# Patient Record
Sex: MALE | Race: WHITE | NOT HISPANIC OR LATINO | URBAN - METROPOLITAN AREA
[De-identification: names, ages, dates, MRNs, and addresses within clinical notes are randomized per-mention and may not be internally consistent; named-entity substitution may affect disease eponyms.]

---

## 2017-07-25 ENCOUNTER — OFFICE VISIT - RIVER FALLS (OUTPATIENT)
Dept: FAMILY MEDICINE | Facility: CLINIC | Age: 52
End: 2017-07-25

## 2017-07-25 ASSESSMENT — MIFFLIN-ST. JEOR: SCORE: 1750.9

## 2017-10-16 ENCOUNTER — AMBULATORY - RIVER FALLS (OUTPATIENT)
Dept: FAMILY MEDICINE | Facility: CLINIC | Age: 52
End: 2017-10-16

## 2018-08-24 ENCOUNTER — OFFICE VISIT - RIVER FALLS (OUTPATIENT)
Dept: FAMILY MEDICINE | Facility: CLINIC | Age: 53
End: 2018-08-24

## 2018-08-24 ASSESSMENT — MIFFLIN-ST. JEOR: SCORE: 1721.87

## 2018-09-07 ENCOUNTER — AMBULATORY - RIVER FALLS (OUTPATIENT)
Dept: FAMILY MEDICINE | Facility: CLINIC | Age: 53
End: 2018-09-07

## 2018-09-07 ENCOUNTER — OFFICE VISIT - RIVER FALLS (OUTPATIENT)
Dept: FAMILY MEDICINE | Facility: CLINIC | Age: 53
End: 2018-09-07

## 2018-09-08 LAB
CHOLEST SERPL-MCNC: 259 MG/DL
CHOLEST/HDLC SERPL: 4.5 {RATIO}
CREAT SERPL-MCNC: 1.02 MG/DL (ref 0.7–1.33)
GLUCOSE BLD-MCNC: 82 MG/DL (ref 65–99)
HDLC SERPL-MCNC: 58 MG/DL
LDLC SERPL CALC-MCNC: 177 MG/DL
NONHDLC SERPL-MCNC: 201 MG/DL
TRIGL SERPL-MCNC: 109 MG/DL

## 2018-09-10 ENCOUNTER — AMBULATORY - RIVER FALLS (OUTPATIENT)
Dept: FAMILY MEDICINE | Facility: CLINIC | Age: 53
End: 2018-09-10

## 2018-10-18 ENCOUNTER — AMBULATORY - RIVER FALLS (OUTPATIENT)
Dept: FAMILY MEDICINE | Facility: CLINIC | Age: 53
End: 2018-10-18

## 2019-05-30 ENCOUNTER — OFFICE VISIT - RIVER FALLS (OUTPATIENT)
Dept: FAMILY MEDICINE | Facility: CLINIC | Age: 54
End: 2019-05-30

## 2019-05-30 ASSESSMENT — MIFFLIN-ST. JEOR: SCORE: 1665.63

## 2019-09-13 ENCOUNTER — OFFICE VISIT - RIVER FALLS (OUTPATIENT)
Dept: FAMILY MEDICINE | Facility: CLINIC | Age: 54
End: 2019-09-13

## 2019-09-13 ASSESSMENT — MIFFLIN-ST. JEOR: SCORE: 1654.74

## 2019-09-26 ENCOUNTER — AMBULATORY - RIVER FALLS (OUTPATIENT)
Dept: FAMILY MEDICINE | Facility: CLINIC | Age: 54
End: 2019-09-26

## 2019-09-27 ENCOUNTER — COMMUNICATION - RIVER FALLS (OUTPATIENT)
Dept: FAMILY MEDICINE | Facility: CLINIC | Age: 54
End: 2019-09-27

## 2019-09-27 LAB
BUN SERPL-MCNC: 19 MG/DL (ref 7–25)
BUN/CREAT RATIO - HISTORICAL: NORMAL (ref 6–22)
CALCIUM SERPL-MCNC: 9.5 MG/DL (ref 8.6–10.3)
CHLORIDE BLD-SCNC: 103 MMOL/L (ref 98–110)
CHOLEST SERPL-MCNC: 229 MG/DL
CHOLEST/HDLC SERPL: 3.8 {RATIO}
CO2 SERPL-SCNC: 29 MMOL/L (ref 20–32)
CREAT SERPL-MCNC: 1.06 MG/DL (ref 0.7–1.33)
EGFRCR SERPLBLD CKD-EPI 2021: 78 ML/MIN/1.73M2
ERYTHROCYTE [DISTWIDTH] IN BLOOD BY AUTOMATED COUNT: 11.7 % (ref 11–15)
GLUCOSE BLD-MCNC: 88 MG/DL (ref 65–99)
HCT VFR BLD AUTO: 50.1 % (ref 38.5–50)
HDLC SERPL-MCNC: 60 MG/DL
HGB BLD-MCNC: 16.5 GM/DL (ref 13.2–17.1)
LDLC SERPL CALC-MCNC: 150 MG/DL
MCH RBC QN AUTO: 30.8 PG (ref 27–33)
MCHC RBC AUTO-ENTMCNC: 32.9 GM/DL (ref 32–36)
MCV RBC AUTO: 93.5 FL (ref 80–100)
NONHDLC SERPL-MCNC: 169 MG/DL
PLATELET # BLD AUTO: 230 10*3/UL (ref 140–400)
PMV BLD: 10.5 FL (ref 7.5–12.5)
POTASSIUM BLD-SCNC: 5.1 MMOL/L (ref 3.5–5.3)
PSA SERPL-MCNC: 1.9 NG/ML
RBC # BLD AUTO: 5.36 10*6/UL (ref 4.2–5.8)
SODIUM SERPL-SCNC: 137 MMOL/L (ref 135–146)
TRIGL SERPL-MCNC: 84 MG/DL
WBC # BLD AUTO: 5.8 10*3/UL (ref 3.8–10.8)

## 2019-10-15 ENCOUNTER — OFFICE VISIT - RIVER FALLS (OUTPATIENT)
Dept: FAMILY MEDICINE | Facility: CLINIC | Age: 54
End: 2019-10-15

## 2019-10-15 LAB — COLONOSCOPY STUDY: NORMAL

## 2020-01-03 ENCOUNTER — COMMUNICATION - RIVER FALLS (OUTPATIENT)
Dept: FAMILY MEDICINE | Facility: CLINIC | Age: 55
End: 2020-01-03

## 2020-01-10 ENCOUNTER — AMBULATORY - RIVER FALLS (OUTPATIENT)
Dept: FAMILY MEDICINE | Facility: CLINIC | Age: 55
End: 2020-01-10

## 2020-10-08 ENCOUNTER — OFFICE VISIT - RIVER FALLS (OUTPATIENT)
Dept: FAMILY MEDICINE | Facility: CLINIC | Age: 55
End: 2020-10-08

## 2020-10-12 ENCOUNTER — COMMUNICATION - RIVER FALLS (OUTPATIENT)
Dept: FAMILY MEDICINE | Facility: CLINIC | Age: 55
End: 2020-10-12

## 2020-10-12 LAB
BUN SERPL-MCNC: 19 MG/DL (ref 7–25)
BUN/CREAT RATIO - HISTORICAL: NORMAL (ref 6–22)
CALCIUM SERPL-MCNC: 9.5 MG/DL (ref 8.6–10.3)
CHLORIDE BLD-SCNC: 103 MMOL/L (ref 98–110)
CO2 SERPL-SCNC: 26 MMOL/L (ref 20–32)
CREAT SERPL-MCNC: 1.03 MG/DL (ref 0.7–1.33)
EGFRCR SERPLBLD CKD-EPI 2021: 80 ML/MIN/1.73M2
ERYTHROCYTE [DISTWIDTH] IN BLOOD BY AUTOMATED COUNT: 11.7 % (ref 11–15)
GLUCOSE BLD-MCNC: 80 MG/DL (ref 65–99)
HCT VFR BLD AUTO: 51.4 % (ref 38.5–50)
HGB BLD-MCNC: 17.3 GM/DL (ref 13.2–17.1)
LDLC SERPL CALC-MCNC: 147 MG/DL
MCH RBC QN AUTO: 31.1 PG (ref 27–33)
MCHC RBC AUTO-ENTMCNC: 33.7 GM/DL (ref 32–36)
MCV RBC AUTO: 92.4 FL (ref 80–100)
PLATELET # BLD AUTO: 225 10*3/UL (ref 140–400)
PMV BLD: 10.2 FL (ref 7.5–12.5)
POTASSIUM BLD-SCNC: 4.7 MMOL/L (ref 3.5–5.3)
PSA SERPL-MCNC: 2.3 NG/ML
RBC # BLD AUTO: 5.56 10*6/UL (ref 4.2–5.8)
SODIUM SERPL-SCNC: 138 MMOL/L (ref 135–146)
WBC # BLD AUTO: 6.4 10*3/UL (ref 3.8–10.8)

## 2022-02-11 VITALS
DIASTOLIC BLOOD PRESSURE: 62 MMHG | HEART RATE: 64 BPM | HEIGHT: 72 IN | BODY MASS INDEX: 26.9 KG/M2 | WEIGHT: 198.6 LBS | TEMPERATURE: 97.8 F | SYSTOLIC BLOOD PRESSURE: 110 MMHG

## 2022-02-11 VITALS
BODY MASS INDEX: 24.03 KG/M2 | WEIGHT: 177.4 LBS | SYSTOLIC BLOOD PRESSURE: 110 MMHG | OXYGEN SATURATION: 96 % | DIASTOLIC BLOOD PRESSURE: 64 MMHG | HEIGHT: 72 IN | TEMPERATURE: 97.3 F | HEART RATE: 64 BPM

## 2022-02-11 VITALS
DIASTOLIC BLOOD PRESSURE: 64 MMHG | SYSTOLIC BLOOD PRESSURE: 118 MMHG | WEIGHT: 179.8 LBS | HEIGHT: 72 IN | BODY MASS INDEX: 24.35 KG/M2 | TEMPERATURE: 97.4 F | HEART RATE: 72 BPM

## 2022-02-11 VITALS
TEMPERATURE: 97.8 F | DIASTOLIC BLOOD PRESSURE: 81 MMHG | SYSTOLIC BLOOD PRESSURE: 121 MMHG | BODY MASS INDEX: 23.57 KG/M2 | HEART RATE: 85 BPM | WEIGHT: 175 LBS

## 2022-02-11 VITALS
HEART RATE: 78 BPM | SYSTOLIC BLOOD PRESSURE: 120 MMHG | WEIGHT: 192.2 LBS | BODY MASS INDEX: 26.03 KG/M2 | HEIGHT: 72 IN | DIASTOLIC BLOOD PRESSURE: 72 MMHG

## 2022-02-16 NOTE — PROGRESS NOTES
Patient:   PIPER ZAVALA            MRN: [id number]            FIN: [id number]               Age:   53 years     Sex:  Male     :  1965   Associated Diagnoses:   Well adult   Author:   Adrian Mckee MD      Visit Information      Date of Service: 2018 02:36 pm  Performing Location: [facility]  Encounter#: [id number]      Primary Care Provider (PCP):  Adrian Mckee MD    NPI# [id number]      Referring Provider:  Adiran Mckee MD    NPI# [id number]      Chief Complaint   2018 2:52 PM CDT    Annual Px     Chief complaint and symptoms noted above confirmed with patient.      Well Adult History   Well Adult History             The patient presents for well adult exam.  The patient's general health status is described as good.  The patient's diet is described as balanced.  Exercise: routine.  Additional pertinent history: seat belt use, occasional caffeine use, tobacco use none and alcohol use socially.        Review of Systems   Constitutional:  Negative.    Eye:  Negative.    Ear/Nose/Mouth/Throat:  Negative.    Respiratory:  Negative.    Cardiovascular:  Negative.    Gastrointestinal:  Negative.    Genitourinary:  Negative.    Musculoskeletal:  Negative.    Integumentary:  Negative.    Neurologic:  Negative.    Psychiatric:  Negative.       Health Status   Allergies:    Allergic Reactions (Selected)  No known allergies   Medications:  (Selected)   Documented Medications  Documented  Daily Multiple Vitamins: po, qweek, 0 Refill(s), Type: Maintenance   Problem list:    All Problems  Hemorrhoids / ICD-9-.6 / Confirmed  Lumbar disc herniation / ICD-9-.10 / Confirmed  Migraine / ICD-9-.90 / Confirmed      Histories   Past Medical History:    Active  Lumbar disc herniation (ICD-9-.10): Onset in  at 37 years.  Comments:  2011 CST 3:35 PM CST - Kamila Cordova  L4, 5  Migraine (ICD-9-.90)  Hemorrhoids (ICD-9-.6)   Family History:     Hypertension  Father ()  Allergy  Daughter (Donna)  Cancer of prostate  Father ()     Procedure history:    Tonsillectomy (SNOMED CT 785683325) in 1969 at 4 Years.   Social History:        Alcohol Assessment: Current            1-2 times per month      Tobacco Assessment: Denies Tobacco Use            Never      Substance Abuse Assessment: Denies Substance Abuse      Employment and Education Assessment            Employed, Work/School description: [employer].      Home and Environment Assessment            Marital status: .  Spouse/Partner name: Margi.  Lives with Spouse.  2 children.  Living situation:               Home/Independent.  Smoker in household: No.      Nutrition and Health Assessment            Type of diet: Regular.  Caffeine intake amount: Min..      Exercise and Physical Activity Assessment: Occasional exercise            Exercise type: Walking, Weight lifting, Push-ups, sit-ups.      Other Assessment            .        Physical Examination   Vital Signs   2018 2:52 PM CDT Peripheral Pulse Rate 78 bpm    Pulse Site Radial artery    HR Method Manual    Systolic Blood Pressure 120 mmHg    Diastolic Blood Pressure 72 mmHg    Mean Arterial Pressure 88 mmHg    BP Site Left arm    BP Method Manual      Measurements from flowsheet : Measurements   2018 2:52 PM CDT Height Measured - Standard 71.5 in    Weight Measured - Standard 192.2 lb    BSA 2.1 m2    Body Mass Index 26.43 kg/m2  HI      General:  Alert and oriented, No acute distress.    Eye:  Normal conjunctiva.    HENT:  Normocephalic.    Neck:  Supple.    Respiratory:  Lungs are clear to auscultation.    Cardiovascular:  Normal rate, Regular rhythm.    Gastrointestinal:  Soft, Non-tender, Non-distended.    Genitourinary:  No costovertebral angle tenderness.    Musculoskeletal:  Normal range of motion, Normal strength, No tenderness.    Integumentary:  Warm, Dry, Pink.    Neurologic:  Alert,  Oriented, Normal sensory, Normal motor function.    Psychiatric:  Patient's PHQ9 and CAGE questionnaire reviewed and discussed with patient..       Impression and Plan   Diagnosis     Well adult (NIN12-DP Z00.00).     Course:  Progressing as expected.    Patient Instructions:       Counseled: Patient, Regarding diagnosis, Regarding medications, Verbalized understanding.    Orders     F/U  if not improving, sooner if getting worse.

## 2022-02-16 NOTE — PROGRESS NOTES
Patient:   PIPER ZAVALA            MRN: [id number]            FIN: [id number]               Age:   55 years     Sex:  Male     :  1965   Associated Diagnoses:   Well adult; Screening for diabetes mellitus; Encounter for screening for lipoid disorders; Screening for prostate cancer   Author:   Adrian Mckee MD      Visit Information      Date of Service: 10/08/2020 07:49 am  Performing Location: [facility]  Encounter#: [id number]      Primary Care Provider (PCP):  Adrian Mckee MD    NPI# [id number]      Referring Provider:  Adrian Mckee MD# [id number]      Chief Complaint   10/8/2020 7:54 AM CDT    Annual physical     Chief complaint and symptoms noted above confirmed with patient.      Well Adult History   Well Adult History             The patient presents for well adult exam.  The patient's general health status is described as good.  The patient's diet is described as balanced.  Exercise: routine.  Associated symptoms consist of none.  Additional pertinent history: no caffeine use, tobacco use none and alcohol use socially.        Review of Systems   Constitutional:  Negative.    Respiratory:  Negative.    Cardiovascular:  Negative.    Gastrointestinal:  Negative.    Genitourinary:  Negative.    Musculoskeletal:  Negative.    Integumentary:  Negative.    Neurologic:  Negative.    Psychiatric:  Negative.       Health Status   Allergies:    Allergic Reactions (Selected)  No Known Medication Allergies   Medications:  (Selected)   Prescriptions  Prescribed  Voltaren Arthritis Pain 1% topical gel: ( 2 gm ), Topical, qid, # 240 gm, 1 Refill(s), Type: Maintenance, Pharmacy: [facility], 2 gm Topical qid, 71.5, in, 19 14:54:00 CDT, Height Measured, 175, lb, 10/08/20 7:54:00 CDT, Raeann...  Documented Medications  Documented  Daily Multiple Vitamins: po, qweek, 0 Refill(s), Type: Maintenance  [facility]'s Bounty  Probiotic: Oral, daily, 0 Refill(s), Type: Maintenance   Problem list:    All Problems  Migraine / ICD-9-.90 / Confirmed  Hemorrhoids / ICD-9-.6 / Confirmed  Lumbar disc herniation / ICD-9-.10 / Confirmed      Histories   Past Medical History:    Active  Lumbar disc herniation (ICD-9-.10): Onset in  at 37 years.  Comments:  2011 CST 3:35 PM CST - Kamila Cordova  L4, 5  Migraine (ICD-9-.90)  Hemorrhoids (ICD-9-.6)   Family History:    Hypertension  Father ()  Allergy  Daughter (Donna)  Cancer of prostate  Father ()     Procedure history:    Colonoscopy (SNOMED CT 630940243) performed by Agustin Keith MD on 10/15/2019 at 54 Years.  Comments:  2019 9:44 AM CST - Beverley Keene CMA  Sedation: conscious  Polyps: none  Diverticuli: no    Follow up: 10 year ()  Tonsillectomy (SNOMED CT 803678520) in  at 4 Years.   Social History:        Alcohol Assessment: Current            Beer (12 oz), 1-2 times per month, 1 drinks/episode average.      Tobacco Assessment: Denies Tobacco Use            Never      Substance Abuse Assessment: Denies Substance Abuse            Never      Employment and Education Assessment            Employed, Work/School description: [employer].  Highest education level: College +.      Home and Environment Assessment            Marital status: .  Spouse/Partner name: Margi.  Lives with Spouse.  2 children.  Living situation:               Home/Independent.  Smoker in household: No.  Injuries/Abuse/Neglect in household: No.  Feels unsafe at home:               No.  Family/Friends available for support: Yes.      Nutrition and Health Assessment            Type of diet: Regular.  Caffeine intake amount: Min..  Wants to lose weight: Yes.  Sleeping concerns: Yes.               Feels highly stressed: No.      Exercise and Physical Activity Assessment: Regular exercise            Exercise frequency: 3-4  times/week.  Exercise type: Walking, Weight lifting.      Sexual Assessment            Sexually active: Yes.  Sexual orientation: Straight or heterosexual.      Other Assessment            .        Physical Examination   Vital Signs   10/8/2020 7:54 AM CDT Temperature Tympanic 97.8 DegF  LOW    Peripheral Pulse Rate 85 bpm    Systolic Blood Pressure 121 mmHg    Diastolic Blood Pressure 81 mmHg  HI    Mean Arterial Pressure 94 mmHg    BP Site Right arm    BP Method Electronic      Measurements from flowsheet : Measurements   10/8/2020 7:54 AM CDT Height/Length Estimated 71.5 in    Weight Measured - Standard 175 lb      General:  Alert and oriented, No acute distress.    Eye:  Pupils are equal, round and reactive to light, Extraocular movements are intact, Normal conjunctiva.    HENT:  Normocephalic, Tympanic membranes are clear, Normal hearing.    Neck:  Supple, Non-tender, No carotid bruit.    Respiratory:  Lungs are clear to auscultation, Respirations are non-labored, Breath sounds are equal.    Cardiovascular:  Normal rate, Regular rhythm, No murmur.    Gastrointestinal:  Soft, Non-tender, Non-distended.    Genitourinary:  No costovertebral angle tenderness.    Lymphatics:  No lymphadenopathy neck, axilla, groin.    Musculoskeletal:  Normal range of motion, Normal strength, No tenderness.    Integumentary:  Warm, Dry, Pink.    Neurologic:  Alert, Oriented, Normal sensory, Normal motor function, No focal deficits.    Psychiatric:  Cooperative, Appropriate mood & affect, Normal judgment, Non-suicidal.       Impression and Plan   Diagnosis     Well adult (CVO38-PU Z00.00).     Screening for diabetes mellitus (ZHD00-JV Z13.1).     Encounter for screening for lipoid disorders (ESK92-BB Z13.220).     Screening for prostate cancer (CWN01-HL Z12.5).     Course:  Progressing as expected.    Patient Instructions:       Counseled: Patient, Regarding diagnosis, Verbalized understanding.    Orders     Orders   Lab (Gen Lab   Reference Lab):  Direct LDL* ([facility]) (Order): Specimen Type: Serum, Collection Date: 10/8/2020 8:13 AM CDT  Basic Metabolic Panel* ([facility]) (Order): Specimen Type: Serum, Collection Date: 10/8/2020 8:13 AM CDT  CBC (h/h, RBC, indices, WBC, Plt)* ([facility]) (Order): Specimen Type: Blood, Collection Date: 10/8/2020 8:13 AM CDT  PSA, Total (Room)* ([facility]) (Order): Specimen Type: Serum, Collection Date: 10/8/2020 8:13 AM CDT.

## 2022-02-16 NOTE — LETTER
(Inserted Image. Unable to display)   October 09, 2021    PIPER LUCAS   [address] [place] [id number]            Dear PIPER,      Thank you for selecting [facility] for your healthcare needs.    Our records indicate you are due for the following services:     Annual Physical.    (FYI   Regarding office visits: In some instances, a video visit or telephone visit may be offered as an option.)      To schedule an appointment or if you have further questions, please contact your clinic at [phone].      Powered by [employer]    Sincerely,    Adrian Mckee MD

## 2022-02-16 NOTE — NURSING NOTE
Comprehensive Intake Entered On:  9/13/2019 3:05 PM CDT    Performed On:  9/13/2019 2:54 PM CDT by Jada Paris MA               Summary   Chief Complaint :   Annual Px    Menstrual Status :   N/A   Weight Measured :   177.4 lb(Converted to: 177 lb 6 oz, 80.47 kg)    Height Measured :   71.5 in(Converted to: 5 ft 11 in, 181.61 cm)    Body Mass Index :   24.39 kg/m2   Body Surface Area :   2.01 m2   Systolic Blood Pressure :   110 mmHg   Diastolic Blood Pressure :   64 mmHg   Mean Arterial Pressure :   79 mmHg   Peripheral Pulse Rate :   64 bpm   BP Site :   Right arm   Pulse Site :   Radial artery   BP Method :   Manual   HR Method :   Manual   Temperature Tympanic :   97.3 DegF(Converted to: 36.3 DegC)  (LOW)    Oxygen Saturation :   96 %   Jada Paris MA - 9/13/2019 2:54 PM CDT   Health Status   Allergies Verified? :   Yes   Medication History Verified? :   Yes   Immunizations Current :   Yes   Medical History Verified? :   Yes   Pre-Visit Planning Status :   Completed   Tobacco Use? :   Never smoker   Jada Paris MA - 9/13/2019 2:54 PM CDT   Consents   Consent for Immunization Exchange :   Consent Granted   Consent for Immunizations to Providers :   Consent Granted   Jada Paris MA - 9/13/2019 2:54 PM CDT   Meds / Allergies   (As Of: 9/13/2019 3:05:31 PM CDT)   Allergies (Active)   No Known Medication Allergies  Estimated Onset Date:   Unspecified ; Created By:   Jada Paris MA; Reaction Status:   Active ; Category:   Drug ; Substance:   No Known Medication Allergies ; Type:   Allergy ; Updated By:   Jada Paris MA; Reviewed Date:   9/13/2019 3:03 PM CDT        Medication List   (As Of: 9/13/2019 3:05:31 PM CDT)   Home Meds    bifidobacterium-lactobacillus  :   bifidobacterium-lactobacillus ; Status:   Documented ; Ordered As Mnemonic:   Nature's Bounty Probiotic ; Simple Display Line:   Oral, daily, 0 Refill(s) ; Catalog Code:   bifidobacterium-lactobacillus ; Order Dt/Tm:   5/30/2019  2:08:13 PM          multivitamin  :   multivitamin ; Status:   Documented ; Ordered As Mnemonic:   Daily Multiple Vitamins ; Simple Display Line:   po, qweek ; Catalog Code:   multivitamin ; Order Dt/Tm:   2/22/2011 3:25:55 PM

## 2022-02-16 NOTE — PROGRESS NOTES
Patient:   PIPER ZAVALA            MRN: [id number]            FIN: [id number]               Age:   52 years     Sex:  Male     :  1965   Associated Diagnoses:   Well adult; Impacted cerumen   Author:   Adrian Mckee MD      Visit Information      Date of Service: 2017 02:45 pm  Performing Location: [facility]  Encounter#: [id number]      Primary Care Provider (PCP):  Adrian Mckee MD    NPI# [id number]      Referring Provider:  Adrian Mckee MD    NPI# [id number]      Chief Complaint   2017 2:55 PM CDT    Annual px; c/o back pain x 3 weeks, L elbow stiffness     Chief complaint and symptoms noted above confirmed with patient.      Well Adult History   Well Adult History             The patient presents for well adult exam.  The patient's general health status is described as good.  The patient's diet is described as balanced.  Exercise: routine, aerobic activity.  Complaint: back pain improving after twisting 3 weeks ago , is moderate and is episodic.  Additional pertinent history: seat belt use, occasional caffeine use, tobacco use none and alcohol use socially.        Review of Systems   Constitutional:  Negative.    Ear/Nose/Mouth/Throat:  Negative.    Respiratory:  Negative.    Cardiovascular:  Negative.    Gastrointestinal:  Negative.    Genitourinary:  Negative.    Hematology/Lymphatics:  Negative.    Endocrine:  Negative.    Immunologic:  Negative.    Musculoskeletal:  Negative except as documented in history of present illness.    Integumentary:  Negative.    Neurologic:  Negative except as documented in history of present illness.    Psychiatric:  Negative.       Health Status   Allergies:    Allergic Reactions (Selected)  No known allergies   Medications:  (Selected)   Documented Medications  Documented  Daily Multiple Vitamins: po, qweek, 0 Refill(s), Type: Maintenance   Problem list:    All Problems  Hemorrhoids / ICD-9-.6 / Confirmed  Lumbar disc  herniation / ICD-9-.10 / Confirmed  Migraine / ICD-9-.90 / Confirmed      Histories   Past Medical History:    Active  Lumbar disc herniation (ICD-9-.10): Onset in  at 37 years.  Comments:  2011 CST 3:35 PM CST - Kamila Cordova  L4, 5  Migraine (ICD-9-.90)  Hemorrhoids (ICD-9-.6)   Family History:    Hypertension  Father ()  Allergy  Daughter (Donna)  Cancer of prostate  Father ()     Procedure history:    Tonsillectomy (SNOMED CT 878667296) in  at 4 Years.   Social History:        Alcohol Assessment: Current            1-2 times per month      Tobacco Assessment: Denies Tobacco Use            Never      Substance Abuse Assessment: Denies Substance Abuse      Employment and Education Assessment            Employed, Work/School description: [employer].      Home and Environment Assessment            Marital status: .  Spouse/Partner name: Margi.  Lives with Spouse.  2 children.  Living situation:               Home/Independent.  Smoker in household: No.      Nutrition and Health Assessment            Type of diet: Regular.  Caffeine intake amount: Min..      Exercise and Physical Activity Assessment: Occasional exercise            Exercise type: Walking, Weight lifting, Push-ups, sit-ups.      Other Assessment            .        Physical Examination   Vital Signs   2017 2:55 PM CDT Temperature Tympanic 97.8 DegF  LOW    Peripheral Pulse Rate 64 bpm    Pulse Site Radial artery    HR Method Manual    Systolic Blood Pressure 110 mmHg    Diastolic Blood Pressure 62 mmHg    Mean Arterial Pressure 78 mmHg    BP Site Right arm    BP Method Manual      Measurements from flowsheet : Measurements   2017 2:55 PM CDT Height Measured - Standard 71.5 in    Weight Measured - Standard 198.6 lb    BSA 2.13 m2    Body Mass Index 27.31 kg/m2      General:  Alert and oriented, No acute distress.    Eye:  Normal conjunctiva.    HENT:   Normocephalic, Tympanic membranes are clear.    Neck:  Supple, Non-tender, No carotid bruit.    Respiratory:  Lungs are clear to auscultation, Respirations are non-labored, Breath sounds are equal.    Cardiovascular:  Normal rate, Regular rhythm, No murmur.    Gastrointestinal:  Soft, Non-tender, Non-distended.    Genitourinary:  No costovertebral angle tenderness.    Lymphatics:  No lymphadenopathy neck, axilla, groin.    Musculoskeletal:  Normal range of motion, Normal strength, No tenderness.         Spine/torso exam: Lumbar ( Nodule, Tenderness, Strength  5 /5, Normal range of motion, Straight leg raising, sitting/distracted ( Negative ) ).    Integumentary:  Warm, Dry, Pink.    Neurologic:  Alert, Oriented, Normal sensory.    Psychiatric:  Cooperative, Appropriate mood & affect, Normal judgment, Non-suicidal.       Procedure   Ear foreign body removal procedure   Date/ Time:  7/25/2017 3:17:00 PM.     Confirmed: patient.     Performed by: self.     Informed consent: Verbally obtained.     Indication: hearing disturbance.     Location: left ear, right ear.     Preparation and technique: positioned sitting upright, method including (cerumen loop, curette, irrigation with warm tap water, otologic syringe).     Results: foreign body removal complete.     Procedure tolerated: well.     No Complications.        Impression and Plan   Diagnosis     Well adult (BXD44-HX Z00.00).     Course:  Progressing as expected.    Diagnosis     Impacted cerumen (BYV60-IU H61.23).     Orders     F/U  if not improving, sooner if getting worse.

## 2022-02-16 NOTE — LETTER
(Inserted Image. Unable to display)     [address] [place] [address] [phone] (phone)  [phone] (fax)  PIPER ZAVALA     [address] [place], [place] [id number]        Dear PIPER,    Thank you for selecting our practice as your care provider. Below you will find the results and recent diagnostic testings outcomes we have reviewed.        These are acceptable, please keep scheduled follow up appointments.      Result Name Current Result Previous Result Reference Range   Sodium Level (mmol/L)  138 10/8/2020  137 9/26/2019 135 - 146   Potassium Level (mmol/L)  4.7 10/8/2020  5.1 9/26/2019 3.5 - 5.3   Chloride Level (mmol/L)  103 10/8/2020  103 9/26/2019 98 - 110   CO2 Level (mmol/L)  26 10/8/2020  29 9/26/2019 20 - 32   Glucose Level (mg/dL)  80 10/8/2020  88 9/26/2019 65 - 99   BUN (mg/dL)  19 10/8/2020  19 9/26/2019 7 - 25   Creatinine Level (mg/dL)  1.03 10/8/2020  1.06 9/26/2019 0.70 - 1.33   eGFR (mL/min/1.73m2)  80 10/8/2020  78 9/26/2019 > OR = 60 -    Calcium Level (mg/dL)  9.5 10/8/2020  9.5 9/26/2019 8.6 - 10.3   LDL Direct (mg/dL) ((H)) 147 10/8/2020   - <100   PSA (ng/mL)  2.3 10/8/2020  1.9 9/26/2019  - < OR = 4.0   WBC  6.4 10/8/2020  5.8 9/26/2019 3.8 - 10.8   RBC  5.56 10/8/2020  5.36 9/26/2019 4.20 - 5.80   Hgb (gm/dL) ((H)) 17.3 10/8/2020  16.5 9/26/2019 13.2 - 17.1   Hct (%) ((H)) 51.4 10/8/2020 ((H)) 50.1 9/26/2019 38.5 - 50.0   MCV (fL)  92.4 10/8/2020  93.5 9/26/2019 80.0 - 100.0   MCH (pg)  31.1 10/8/2020  30.8 9/26/2019 27.0 - 33.0   MCHC (gm/dL)  33.7 10/8/2020  32.9 9/26/2019 32.0 - 36.0   RDW (%)  11.7 10/8/2020  11.7 9/26/2019 11.0 - 15.0   Platelet  225 10/8/2020  230 9/26/2019 140 - 400   MPV (fL)  10.2 10/8/2020  10.5 9/26/2019 7.5 - 12.5       Please contact my practice at the number listed above if you have any questions or concerns.     Sincerely,        Adrian Mckee MD, FAAFP

## 2022-02-16 NOTE — TELEPHONE ENCOUNTER
---------------------  From: Agustin Keith MD   To: PIPER ZAVALA    Cc: Adrian Mckee MD;      Sent: 10/15/2019 9:22:16 AM CDT  Subject: Colonoscopy     Dear Piper Viveros had a colonoscopy done for colon cancer screening because of a positive FIT (stool) test on Tuesday October 15th, 2019. It was normal. I would recommend a follow up colonoscopy in 10 years and sooner if new symptoms develop.    Navi Keith MD---------------------  From: Adrian Mckee MD   To: Agustin Keith MD;     Sent: 10/15/2019 9:45:11 AM CDT  Subject: RE: Colonoscopy     Tuesday October 14th.---------------------  From: Agustin Keith MD   To: Adrian Mckee MD;     Sent: 10/15/2019 10:28:21 AM CDT  Subject: RE: Colonoscopy     Tuesday October 15th

## 2022-02-16 NOTE — TELEPHONE ENCOUNTER
Order, demographics, and notes faxed to [facility], they will contact patient to schedule.

## 2022-02-16 NOTE — NURSING NOTE
Comprehensive Intake Entered On:  5/30/2019 2:10 PM CDT    Performed On:  5/30/2019 2:05 PM CDT by Jada Paris MA               Summary   Chief Complaint :   Mole on right arm   Menstrual Status :   N/A   Weight Measured :   179.8 lb(Converted to: 179 lb 13 oz, 81.56 kg)    Height Measured :   71.5 in(Converted to: 5 ft 11 in, 181.61 cm)    Body Mass Index :   24.72 kg/m2   Body Surface Area :   2.03 m2   Systolic Blood Pressure :   118 mmHg   Diastolic Blood Pressure :   64 mmHg   Mean Arterial Pressure :   82 mmHg   Peripheral Pulse Rate :   72 bpm   BP Site :   Right arm   Pulse Site :   Radial artery   BP Method :   Manual   HR Method :   Manual   Temperature Tympanic :   97.4 DegF(Converted to: 36.3 DegC)  (LOW)    Jada Paris MA - 5/30/2019 2:05 PM CDT   Health Status   Allergies Verified? :   Yes   Medication History Verified? :   Yes   Immunizations Current :   Yes   Medical History Verified? :   Yes   Pre-Visit Planning Status :   Completed   Tobacco Use? :   Never smoker   Jada Paris MA - 5/30/2019 2:05 PM CDT   Consents   Consent for Immunization Exchange :   Consent Granted   Consent for Immunizations to Providers :   Consent Granted   Jada Paris MA - 5/30/2019 2:05 PM CDT   Meds / Allergies   (As Of: 5/30/2019 2:10:40 PM CDT)   Allergies (Active)   No known allergies  Estimated Onset Date:   Unspecified ; Created By:   Kamila Cordova; Reaction Status:   Active ; Category:   Drug ; Substance:   No known allergies ; Type:   Allergy ; Updated By:   Kamila Cordova; Reviewed Date:   5/30/2019 2:08 PM CDT        Medication List   (As Of: 5/30/2019 2:10:40 PM CDT)   Home Meds    bifidobacterium-lactobacillus  :   bifidobacterium-lactobacillus ; Status:   Documented ; Ordered As Mnemonic:   Nature's Bounty Probiotic ; Simple Display Line:   Oral, daily, 0 Refill(s) ; Catalog Code:   bifidobacterium-lactobacillus ; Order Dt/Tm:   5/30/2019 2:08:13 PM          multivitamin  :   multivitamin  ; Status:   Documented ; Ordered As Mnemonic:   Daily Multiple Vitamins ; Simple Display Line:   po, qweek ; Catalog Code:   multivitamin ; Order Dt/Tm:   2/22/2011 3:25:55 PM

## 2022-02-16 NOTE — LETTER
(Inserted Image. Unable to display)   [address] [place]  [address]  [phone]    September 27, 2019      PIPER ZAVALA       [address] [place], [place] [id number]        Dear PIPER,    Thank you for selecting [facility] for your healthcare needs. Below you will find the result of your recent test(s) done at our clinic.     Your lipids are improved, but still mildly elevated. The rest of your labs are fine.       Result Name Current Result Previous Result Reference Range   Sodium Level (mmol/L)  137 9/26/2019  139 9/7/2018 135 - 146   Potassium Level (mmol/L)  5.1 9/26/2019  5.0 9/7/2018 3.5 - 5.3   Chloride Level (mmol/L)  103 9/26/2019  104 9/7/2018 98 - 110   CO2 Level (mmol/L)  29 9/26/2019  27 9/7/2018 20 - 32   Glucose Level (mg/dL)  88 9/26/2019  82 9/7/2018 65 - 99   BUN (mg/dL)  19 9/26/2019  21 9/7/2018 7 - 25   Creatinine Level (mg/dL)  1.06 9/26/2019  1.02 9/7/2018 0.70 - 1.33   Calcium Level (mg/dL)  9.5 9/26/2019  9.7 9/7/2018 8.6 - 10.3   Cholesterol (mg/dL) ((H)) 229 9/26/2019 ((H)) 259 9/7/2018  - <200   Non-HDL Cholesterol ((H)) 169 9/26/2019 ((H)) 201 9/7/2018  - <130   HDL (mg/dL)  60 9/26/2019  58 9/7/2018 >40 -    Cholesterol/HDL Ratio  3.8 9/26/2019  4.5 9/7/2018  - <5.0   LDL ((H)) 150 9/26/2019 ((H)) 177 9/7/2018    Triglyceride (mg/dL)  84 9/26/2019  109 9/7/2018  - <150   PSA (ng/mL)  1.9 9/26/2019   - < OR = 4.0   WBC  5.8 9/26/2019  6.3 9/7/2018 3.8 - 10.8   RBC  5.36 9/26/2019  5.73 9/7/2018 4.20 - 5.80   Hgb (gm/dL)  16.5 9/26/2019 ((H)) 17.9 9/7/2018 13.2 - 17.1   Hct (%) ((H)) 50.1 9/26/2019 ((H)) 53.0 9/7/2018 38.5 - 50.0   MCV (fL)  93.5 9/26/2019  92.5 9/7/2018 80.0 - 100.0   MCH (pg)  30.8 9/26/2019  31.2 9/7/2018 27.0 - 33.0   MCHC (gm/dL)  32.9 9/26/2019  33.8 9/7/2018 32.0 - 36.0   RDW (%)  11.7 9/26/2019  12.1 9/7/2018 11.0 - 15.0   Platelet  230 9/26/2019  240 9/7/2018 140 - 400   MPV (fL)  10.5 9/26/2019  10.3 9/7/2018 7.5 - 12.5       Please  contact my practice at [phone] if you have any questions or concerns.      Sincerely,        Adrian Mckee MD ,   Kamila Alvarado MD,   Martín Nick PA-C

## 2022-02-16 NOTE — PROGRESS NOTES
Patient:   PIPER ZAVALA            MRN: [id number]            FIN: [id number]               Age:   54 years     Sex:  Male     :  1965   Associated Diagnoses:   Ecchymosis; Finger tendinitis   Author:   Adrian Mckee MD      Visit Information      Date of Service: 2019 02:00 pm  Performing Location: [facility]  Encounter#: [id number]      Primary Care Provider (PCP):  Adrian Mckee MD    NPI# [id number]      Referring Provider:  Adrian Mckee MD    NPI# [id number]      Chief Complaint   2019 2:05 PM CDT    Mole on right arm     Chief complaint and symptoms noted above confirmed with patient.   also left index finger   Overuse injury         History of Present Illness             The patient presents with Sudden onset mole right forearm.        Review of Systems   Constitutional:  Negative.    Respiratory:  Negative.    Cardiovascular:  Negative.    Musculoskeletal:  Joint pain, left index finger.       Health Status   Allergies:    Allergic Reactions (Selected)  No known allergies   Medications:  (Selected)   Documented Medications  Documented  Daily Multiple Vitamins: po, qweek, 0 Refill(s), Type: Maintenance  Nature's Bounty Probiotic: Oral, daily, 0 Refill(s), Type: Maintenance   Problem list:    All Problems  Hemorrhoids / ICD-9-.6 / Confirmed  Lumbar disc herniation / ICD-9-.10 / Confirmed  Migraine / ICD-9-.90 / Confirmed      Histories   Past Medical History:    Active  Lumbar disc herniation (ICD-9-.10): Onset in  at 37 years.  Comments:  2011 CST 3:35 PM CST - Kamila Cordova  L4, 5  Migraine (ICD-9-.90)  Hemorrhoids (ICD-9-.6)   Family History:    Hypertension  Father ()  Allergy  Daughter (Donna)  Cancer of prostate  Father ()     Procedure history:    Tonsillectomy (SNOMED CT 703830983) in  at 4 Years.   Social History:        Alcohol Assessment: Current            1-2 times per month       Tobacco Assessment: Denies Tobacco Use            Never      Substance Abuse Assessment: Denies Substance Abuse            Never      Employment and Education Assessment            Employed, Work/School description: District Conservationist.      Home and Environment Assessment            Marital status: .  Spouse/Partner name: Margi.  Lives with Spouse.  2 children.  Living situation:               Home/Independent.  Smoker in household: No.      Nutrition and Health Assessment            Type of diet: Regular.  Caffeine intake amount: Min..      Exercise and Physical Activity Assessment: Regular exercise            Exercise frequency: 3-4 times/week.  Exercise type: Walking, Weight lifting.      Sexual Assessment            Sexually active: Yes.  Sexual orientation: Straight or heterosexual.      Other Assessment            .      Physical Examination   Vital Signs   5/30/2019 2:05 PM CDT Temperature Tympanic 97.4 DegF  LOW    Peripheral Pulse Rate 72 bpm    Pulse Site Radial artery    HR Method Manual    Systolic Blood Pressure 118 mmHg    Diastolic Blood Pressure 64 mmHg    Mean Arterial Pressure 82 mmHg    BP Site Right arm    BP Method Manual      Measurements from flowsheet : Measurements   5/30/2019 2:05 PM CDT Height Measured - Standard 71.5 in    Weight Measured - Standard 179.8 lb    BSA 2.03 m2    Body Mass Index 24.72 kg/m2      General:  Alert and oriented, No acute distress.    Musculoskeletal:       Upper extremity exam: Fingers ( Left, Second finger, Swelling, Tenderness, No crepitus, Normal , Normal range of motion ).    Integumentary:  Warm, Dry, Pink, under dermoscope, punctate wound noted.  Bruise, will watch and return if not resolved in a month..       Impression and Plan   Diagnosis     Ecchymosis (DPY01-IQ R58).     Course:  Progressing as expected.    Orders     Will watch for resolution.     Diagnosis     Finger tendinitis (TOX10-TN M65.842).     Course:  Progressing as  expected.    Orders     Orders   Pharmacy:  predniSONE 10 mg oral tablet (Prescribe): See Instructions, Instructions: 4 tabs daily for 4 days then 3 tabs daily for 4 days then 2 tabs daily for 4 days then 1 tab daily for 4 days then 1/2 tab daily for 4 days, # 42 tab(s), 0 Refill(s), Type: Acute, Pharmacy: [facility]/pharmacy #[id number], 4 tabs daily for 4 days then 3 tabs daily for 4 days then 2 tabs daily for 4 days then 1 tab daily for 4 days then 1/2 tab daily for 4 days.

## 2022-02-16 NOTE — LETTER
(Inserted Image. Unable to display)   November 26, 2019      PIPER LUCAS   [address] [place], [place] [id number]        Dear PIPER,      Thank you for selecting [facility] (previously [facility]) for your healthcare needs.     Our records indicate you are due for the following services:     Immunization update    To schedule an appointment or if you have further questions, please contact your primary clinic:   [facility]          [phone]   [facility]    [phone]             [facility]         [phone]      Powered by [employer]    Sincerely,    Adrian Mckee M.D.

## 2022-02-16 NOTE — PROGRESS NOTES
Patient:   PIPER ZAVALA            MRN: [id number]            FIN: [id number]               Age:   54 years     Sex:  Male     :  1965   Associated Diagnoses:   Well adult; Screening for diabetes mellitus; Encounter for screening for lipoid disorders; Screening for prostate cancer   Author:   Adrian Mckee MD      Visit Information      Date of Service: 2019 02:50 pm  Performing Location: [facility]  Encounter#: [id number]      Primary Care Provider (PCP):  Adrian Mckee MD    NPYE# [id number]      Referring Provider:  Adrian Mckee MD# [id number]      Chief Complaint   2019 2:54 PM CDT    Annual Px     Chief complaint and symptoms noted above confirmed with patient.      Well Adult History   Well Adult History             The patient presents for well adult exam.  The patient's general health status is described as good.  The patient's diet is described as balanced.  Exercise: routine.  Associated symptoms consist of none.  Additional pertinent history: no caffeine use, tobacco use none and alcohol use socially.        Review of Systems   Constitutional:  Negative.    Respiratory:  Negative.    Cardiovascular:  Negative.    Gastrointestinal:  Negative.    Genitourinary:  Negative.    Musculoskeletal:  Negative.    Integumentary:  Negative.    Neurologic:  Negative.    Psychiatric:  Negative.       Health Status   Allergies:    Allergic Reactions (Selected)  No Known Medication Allergies   Medications:  (Selected)   Documented Medications  Documented  Daily Multiple Vitamins: po, qweek, 0 Refill(s), Type: Maintenance  Nature's Bounty Probiotic: Oral, daily, 0 Refill(s), Type: Maintenance   Problem list:    All Problems  Hemorrhoids / ICD-9-.6 / Confirmed  Lumbar disc herniation / ICD-9-.10 / Confirmed  Migraine / ICD-9-.90 / Confirmed      Histories   Past Medical History:    Active  Lumbar disc herniation (ICD-9-.10): Onset in  at  37 years.  Comments:  2011 CST 3:35 PM CST - Kamila Cordova  L4, 5  Migraine (ICD-9-.90)  Hemorrhoids (ICD-9-.6)   Family History:    Hypertension  Father ()  Allergy  Daughter (Donna)  Cancer of prostate  Father ()     Procedure history:    Tonsillectomy (SNOMED CT 966169370) in 1969 at 4 Years.   Social History:        Alcohol Assessment: Current            1-2 times per month      Tobacco Assessment: Denies Tobacco Use            Never      Substance Abuse Assessment: Denies Substance Abuse            Never      Employment and Education Assessment            Employed, Work/School description: District Conservationist.      Home and Environment Assessment            Marital status: .  Spouse/Partner name: Margi.  Lives with Spouse.  2 children.  Living situation:               Home/Independent.  Smoker in household: No.      Nutrition and Health Assessment            Type of diet: Regular.  Caffeine intake amount: Min..      Exercise and Physical Activity Assessment: Regular exercise            Exercise frequency: 3-4 times/week.  Exercise type: Walking, Weight lifting.      Sexual Assessment            Sexually active: Yes.  Sexual orientation: Straight or heterosexual.      Other Assessment            .        Physical Examination   Vital Signs   2019 2:54 PM CDT Temperature Tympanic 97.3 DegF  LOW    Peripheral Pulse Rate 64 bpm    Pulse Site Radial artery    HR Method Manual    Systolic Blood Pressure 110 mmHg    Diastolic Blood Pressure 64 mmHg    Mean Arterial Pressure 79 mmHg    BP Site Right arm    BP Method Manual    Oxygen Saturation 96 %      Measurements from flowsheet : Measurements   2019 2:54 PM CDT Height Measured - Standard 71.5 in    Weight Measured - Standard 177.4 lb    BSA 2.01 m2    Body Mass Index 24.39 kg/m2      General:  Alert and oriented, No acute distress.    Eye:  Pupils are equal, round and reactive to light, Extraocular  movements are intact, Normal conjunctiva.    HENT:  Normocephalic, Tympanic membranes are clear, Normal hearing.    Neck:  Supple, Non-tender, No carotid bruit.    Respiratory:  Lungs are clear to auscultation, Respirations are non-labored, Breath sounds are equal.    Cardiovascular:  Normal rate, Regular rhythm, No murmur.    Gastrointestinal:  Soft, Non-tender, Non-distended.    Genitourinary:  No costovertebral angle tenderness.    Lymphatics:  No lymphadenopathy neck, axilla, groin.    Musculoskeletal:  Normal range of motion, Normal strength, No tenderness.    Integumentary:  Warm, Dry, Pink.    Neurologic:  Alert, Oriented, Normal sensory, Normal motor function, No focal deficits.    Psychiatric:  Cooperative, Appropriate mood & affect, Normal judgment, Non-suicidal.       Impression and Plan   Diagnosis     Well adult (MCG80-SF Z00.00).     Screening for diabetes mellitus (NNW10-XC Z13.1).     Encounter for screening for lipoid disorders (CGG84-ZK Z13.220).     Screening for prostate cancer (FCT28-JD Z12.5).     Course:  Progressing as expected.    Patient Instructions:       Counseled: Patient, Regarding diagnosis, Verbalized understanding.    Orders     Orders   Lab (Gen Lab  Reference Lab):  PSA, Total (Room)* ([facility]) (Order): Specimen Type: Serum, *Est. Collection Date: 9/27/2019 +/- 2 day(s), Future Order  Lipid panel with reflex to direct ldl* ([facility]) (Order): Specimen Type: Serum, *Est. Collection Date: 9/27/2019 +/- 2 day(s), Future Order  CBC (h/h, RBC, indices, WBC, Plt)* ([facility]) (Order): Specimen Type: Blood, *Est. Collection Date: 9/27/2019 +/- 2 day(s), Future Order  Basic Metabolic Panel* ([facility]) (Order): Specimen Type: Serum, *Est. Collection Date: 9/27/2019 +/- 2 day(s), Future Order.

## 2022-02-16 NOTE — NURSING NOTE
Hearing and Vision Screening Entered On:  9/13/2019 3:06 PM CDT    Performed On:  9/13/2019 3:05 PM CDT by Jada Paris MA               Hearing and Vision Screening   Audiogram Result Right Ear :   Pass   Audiogram Result Left Ear :   Pass   Jada Paris MA - 9/13/2019 3:05 PM CDT

## 2022-02-16 NOTE — LETTER
(Inserted Image. Unable to display)   August 26, 2019      PIPER LUCAS   [address] [place], [place] [id number]        Dear PIPER,      Thank you for selecting [facility] (previously [facility]) for your healthcare needs.     Our records indicate you are due for the following services:     Annual Physical    To schedule an appointment or if you have further questions, please contact your primary clinic:   [facility]          [phone]   [facility]    [phone]             [facility]         [phone]      Powered by [employer]    Sincerely,    Adrian Mckee M.D.

## 2022-02-16 NOTE — TELEPHONE ENCOUNTER
---------------------  From: Imani Ramires CMA (Phone Messages Pool ([id number]_[facility])   To: Adrian Mckee MD;     Sent: 1/3/2020 7:56:49 AM CST  Subject: FW: Colonoscopy           ---------------------  From: PIPER MUNGUIA  To: [facility]  Sent: 01/02/2020 05:37 p.m. CST  Subject: FW: Colonoscopy  Dr. Mckee,    My insurance, [employer], is refusing to cover my colonoscopy due to the fact that the coders in [place] changed my SCREENING to a DIAGNOSTIC procedure due to Dr. Keith s comment below that referenced the positive FIT (stool) test.    I spoke w/ their dept a month or more ago & was told someone would follow up w/ Dr. Keith on this & send me a letter.  To my knowledge, this has NOT happened.    I m assuming, since my colonoscopy came back normal, that my procedure should in fact be classified as a SCREENING (thus allowing my insurance to cover the procedure).    Could you please look into this matter on my behalf & get this straightened out?    I also don t understand how the  could change it to a DIAGNOSTIC, when Dr. Keith clearly stated it was a screening??    Thank you in advance for your assistance w/ this matter (please let me know what you find out),    Piper Munguia  ---------------------  From: Agustin Keith MD  To: PIPER MUNGUIA  Cc: Adrian Mckee MD;  Sent: 10/15/2019 9:22:16 AM CDT  Subject: Colonoscopy       Dear Piper       You had a colonoscopy done for colon cancer screening because of a positive FIT (stool) test on Tuesday October 15th, 2019. It was normal. I would recommend a follow up colonoscopy in 10 years and sooner if new symptoms develop.       Navi Keith MD---------------------  From: Adrian Mckee MD   To: Agustin Keith MD;     Sent: 1/3/2020 8:12:45 AM CST  Subject: FW: Colonoscopy     Do you know anything about this?---------------------  From: Adrian Mckee MD   To: Lanette Thomas; Agustin Keith MD;      Sent: 1/3/2020 1:09:19 PM CST  Subject: FW: Colonoscopy     I agree with the patient, it was done as a screening colonoscopy.  If doing a fit test negates your ability to have a screening colonoscopy, do we have an obligation to provide informed consent with the fit test?---------------------  From: Agustin Keith MD   To: Lanette Thomas; Rylee STANLEY, Adrian;     Sent: 1/3/2020 4:48:34 PM CST  Subject: RE: Colonoscopy     Lanette  Let's talk about this on Monday while I am in clinic.  Navi

## 2022-02-16 NOTE — LETTER
(Inserted Image. Unable to display)     September 18, 2020      PIPER LUCAS   [address] [place], [place] [id number]          Dear PIPER,      Thank you for selecting [facility] for your healthcare needs.      Our records indicate you are due for the following services:     Annual Physical.      To schedule an appointment or if you have further questions, please contact your primary clinic:   [facility]       [phone]   [facility]       [phone]              [facility]     [phone]      Powered by [employer] [place] and [employer]    Sincerely,    Adrian Mckee MD

## 2022-02-16 NOTE — NURSING NOTE
Hearing Screening Entered On:  10/8/2020 7:59 AM CDT    Performed On:  10/8/2020 7:59 AM CDT by Pushpa Connor CMA               Additional Hearing Screen   Hearing Screen Pass or Fail :   Pass   Hearing Screen Comments :   Bilateral    Pushpa Connor CMA - 10/8/2020 7:59 AM CDT

## 2022-02-16 NOTE — TELEPHONE ENCOUNTER
---------------------From: Jada Paris MA (Phone Messages Pool ([id number] - [place])) Sent: 11/19/2019 4:12:23 PM CSTSubject: Phone Note: colonoscopy PCP:   [facility]  Time of Call:  4:00   Person Calling:  MarkPhone number:  [phone] call at: _Note:   Patient called stating he received a bill from the [facility] and that this Colonoscopy was not covered. They told him [facility] had coded it as diagnostic not screening. The order that was sent to Referrals says screening. I did recommend he talk to the Business Office and we do not work with coding here in [place]. Also let him know that [facility] would not have been the one to code his procedure as it was done in [place]. Pharmacy: n/Mary office visit and reason:  9/13/19Transferred to: n/a

## 2022-02-16 NOTE — NURSING NOTE
Comprehensive Intake Entered On:  10/8/2020 7:58 AM CDT    Performed On:  10/8/2020 7:54 AM CDT by Pushpa Connor CMA               Summary   Chief Complaint :   Annual physical   Menstrual Status :   N/A   Weight Measured :   175 lb(Converted to: 175 lb 0 oz, 79.379 kg)    Height/Length Estimated :   71.5 in(Converted to: 5 ft 11 in, 181.61 cm)    Systolic Blood Pressure :   121 mmHg   Diastolic Blood Pressure :   81 mmHg (HI)    Mean Arterial Pressure :   94 mmHg   Peripheral Pulse Rate :   85 bpm   BP Site :   Right arm   BP Method :   Electronic   Temperature Tympanic :   97.8 DegF(Converted to: 36.6 DegC)  (LOW)    Pushpa Connor CMA - 10/8/2020 7:54 AM CDT   Health Status   Allergies Verified? :   Yes   Medication History Verified? :   Yes   Immunizations Current :   Yes   Medical History Verified? :   Yes   Pre-Visit Planning Status :   Completed   Tobacco Use? :   Never smoker   Pushpa Connor CMA - 10/8/2020 7:54 AM CDT   Consents   Consent for Immunization Exchange :   Consent Granted   Consent for Immunizations to Providers :   Consent Granted   Pushpa Connor CMA - 10/8/2020 7:54 AM CDT   Meds / Allergies   (As Of: 10/8/2020 7:58:38 AM CDT)   Allergies (Active)   No Known Medication Allergies  Estimated Onset Date:   Unspecified ; Created By:   Jada Paris MA; Reaction Status:   Active ; Category:   Drug ; Substance:   No Known Medication Allergies ; Type:   Allergy ; Updated By:   Jada Paris MA; Reviewed Date:   10/8/2020 7:56 AM CDT        Medication List   (As Of: 10/8/2020 7:58:38 AM CDT)   Home Meds    bifidobacterium-lactobacillus  :   bifidobacterium-lactobacillus ; Status:   Documented ; Ordered As Mnemonic:   Nature's Bounty Probiotic ; Simple Display Line:   Oral, daily, 0 Refill(s) ; Catalog Code:   bifidobacterium-lactobacillus ; Order Dt/Tm:   5/30/2019 2:08:13 PM CDT          multivitamin  :   multivitamin ; Status:   Documented ; Ordered As Mnemonic:   Daily Multiple Vitamins ;  Simple Display Line:   po, qweek ; Catalog Code:   multivitamin ; Order Dt/Tm:   2/22/2011 3:25:55 PM CST            ID Risk Screen   Recent Travel History :   No recent travel   Family Member Travel History :   No recent travel   Other Exposure to Infectious Disease :   Unknown   Pushpa Connor CMA - 10/8/2020 7:54 AM CDT

## 2022-02-28 ENCOUNTER — LAB SERVICES (OUTPATIENT)
Dept: LAB | Age: 57
End: 2022-02-28

## 2022-02-28 ENCOUNTER — OFFICE VISIT (OUTPATIENT)
Dept: FAMILY MEDICINE | Age: 57
End: 2022-02-28

## 2022-02-28 VITALS
TEMPERATURE: 98.2 F | HEART RATE: 90 BPM | SYSTOLIC BLOOD PRESSURE: 132 MMHG | HEIGHT: 71 IN | DIASTOLIC BLOOD PRESSURE: 78 MMHG | WEIGHT: 175.6 LBS | OXYGEN SATURATION: 98 % | BODY MASS INDEX: 24.58 KG/M2

## 2022-02-28 DIAGNOSIS — Z11.59 NEED FOR HEPATITIS C SCREENING TEST: ICD-10-CM

## 2022-02-28 DIAGNOSIS — Z00.00 ROUTINE ADULT HEALTH MAINTENANCE: ICD-10-CM

## 2022-02-28 DIAGNOSIS — Z76.89 ESTABLISHING CARE WITH NEW DOCTOR, ENCOUNTER FOR: Primary | ICD-10-CM

## 2022-02-28 LAB
ALBUMIN SERPL-MCNC: 4.2 G/DL (ref 3.6–5.1)
ALBUMIN/GLOB SERPL: 1.2 {RATIO} (ref 1–2.4)
ALP SERPL-CCNC: 84 UNITS/L (ref 45–117)
ALT SERPL-CCNC: 27 UNITS/L
ANION GAP SERPL CALC-SCNC: 11 MMOL/L (ref 10–20)
AST SERPL-CCNC: 29 UNITS/L
BASOPHILS # BLD: 0 K/MCL (ref 0–0.3)
BASOPHILS NFR BLD: 0 %
BILIRUB SERPL-MCNC: 0.8 MG/DL (ref 0.2–1)
BUN SERPL-MCNC: 22 MG/DL (ref 6–20)
BUN/CREAT SERPL: 24 (ref 7–25)
CALCIUM SERPL-MCNC: 9.3 MG/DL (ref 8.4–10.2)
CHLORIDE SERPL-SCNC: 104 MMOL/L (ref 98–107)
CHOLEST SERPL-MCNC: 223 MG/DL
CHOLEST/HDLC SERPL: 3.4 {RATIO}
CO2 SERPL-SCNC: 30 MMOL/L (ref 21–32)
CREAT SERPL-MCNC: 0.9 MG/DL (ref 0.67–1.17)
DEPRECATED RDW RBC: 43.6 FL (ref 39–50)
EOSINOPHIL # BLD: 0.1 K/MCL (ref 0–0.5)
EOSINOPHIL NFR BLD: 2 %
ERYTHROCYTE [DISTWIDTH] IN BLOOD: 12.8 % (ref 11–15)
FASTING DURATION TIME PATIENT: 4 HOURS (ref 0–999)
FASTING DURATION TIME PATIENT: 4 HOURS (ref 0–999)
GFR SERPLBLD BASED ON 1.73 SQ M-ARVRAT: >90 ML/MIN
GLOBULIN SER-MCNC: 3.4 G/DL (ref 2–4)
GLUCOSE SERPL-MCNC: 100 MG/DL (ref 70–99)
HBA1C MFR BLD: 5.2 % (ref 4.5–5.6)
HCT VFR BLD CALC: 50.4 % (ref 39–51)
HDLC SERPL-MCNC: 66 MG/DL
HGB BLD-MCNC: 16.7 G/DL (ref 13–17)
LDLC SERPL CALC-MCNC: 144 MG/DL
LYMPHOCYTES # BLD: 1.2 K/MCL (ref 1–4)
LYMPHOCYTES NFR BLD: 17 %
MCH RBC QN AUTO: 31.3 PG (ref 26–34)
MCHC RBC AUTO-ENTMCNC: 33.1 G/DL (ref 32–36.5)
MCV RBC AUTO: 94.6 FL (ref 78–100)
MONOCYTES # BLD: 0.8 K/MCL (ref 0.3–0.9)
MONOCYTES NFR BLD: 11 %
NEUTROPHILS # BLD: 4.8 K/MCL (ref 1.8–7.7)
NEUTROPHILS NFR BLD: 70 %
NONHDLC SERPL-MCNC: 157 MG/DL
PLATELET # BLD AUTO: 214 K/MCL (ref 140–450)
POTASSIUM SERPL-SCNC: 4.8 MMOL/L (ref 3.4–5.1)
PROT SERPL-MCNC: 7.6 G/DL (ref 6.4–8.2)
RBC # BLD: 5.33 MIL/MCL (ref 4.5–5.9)
SODIUM SERPL-SCNC: 140 MMOL/L (ref 135–145)
TRIGL SERPL-MCNC: 64 MG/DL
WBC # BLD: 6.9 K/MCL (ref 4.2–11)

## 2022-02-28 PROCEDURE — 36415 COLL VENOUS BLD VENIPUNCTURE: CPT | Performed by: INTERNAL MEDICINE

## 2022-02-28 PROCEDURE — 86803 HEPATITIS C AB TEST: CPT | Performed by: INTERNAL MEDICINE

## 2022-02-28 PROCEDURE — 99396 PREV VISIT EST AGE 40-64: CPT | Performed by: STUDENT IN AN ORGANIZED HEALTH CARE EDUCATION/TRAINING PROGRAM

## 2022-02-28 PROCEDURE — 80061 LIPID PANEL: CPT | Performed by: INTERNAL MEDICINE

## 2022-02-28 PROCEDURE — 83036 HEMOGLOBIN GLYCOSYLATED A1C: CPT | Performed by: INTERNAL MEDICINE

## 2022-02-28 PROCEDURE — 80053 COMPREHEN METABOLIC PANEL: CPT | Performed by: INTERNAL MEDICINE

## 2022-02-28 PROCEDURE — 85025 COMPLETE CBC W/AUTO DIFF WBC: CPT | Performed by: INTERNAL MEDICINE

## 2022-02-28 ASSESSMENT — PATIENT HEALTH QUESTIONNAIRE - PHQ9
CLINICAL INTERPRETATION OF PHQ2 SCORE: NO FURTHER SCREENING NEEDED
1. LITTLE INTEREST OR PLEASURE IN DOING THINGS: SEVERAL DAYS
SUM OF ALL RESPONSES TO PHQ9 QUESTIONS 1 AND 2: 2
SUM OF ALL RESPONSES TO PHQ9 QUESTIONS 1 AND 2: 2
2. FEELING DOWN, DEPRESSED OR HOPELESS: SEVERAL DAYS

## 2022-03-01 ENCOUNTER — TELEPHONE (OUTPATIENT)
Dept: FAMILY MEDICINE | Age: 57
End: 2022-03-01

## 2022-03-01 DIAGNOSIS — E78.5 ELEVATED LIPIDS: Primary | ICD-10-CM

## 2022-03-01 DIAGNOSIS — K13.0 LESION OF LIP: ICD-10-CM

## 2022-03-01 LAB — HCV AB SER QL: NEGATIVE

## 2022-03-09 ENCOUNTER — OFFICE VISIT (OUTPATIENT)
Dept: DERMATOLOGY | Age: 57
End: 2022-03-09
Attending: STUDENT IN AN ORGANIZED HEALTH CARE EDUCATION/TRAINING PROGRAM

## 2022-03-09 DIAGNOSIS — R23.8 VENOUS LAKE OF LIP: Primary | ICD-10-CM

## 2022-03-09 PROCEDURE — 99203 OFFICE O/P NEW LOW 30 MIN: CPT | Performed by: DERMATOLOGY

## 2022-06-06 ENCOUNTER — TELEPHONE (OUTPATIENT)
Dept: FAMILY MEDICINE | Age: 57
End: 2022-06-06

## 2022-07-22 ENCOUNTER — OFFICE VISIT (OUTPATIENT)
Dept: FAMILY MEDICINE | Age: 57
End: 2022-07-22

## 2022-07-22 VITALS
HEART RATE: 77 BPM | WEIGHT: 172 LBS | DIASTOLIC BLOOD PRESSURE: 72 MMHG | BODY MASS INDEX: 23.99 KG/M2 | OXYGEN SATURATION: 100 % | SYSTOLIC BLOOD PRESSURE: 126 MMHG

## 2022-07-22 DIAGNOSIS — R41.3 MEMORY LOSS OR IMPAIRMENT: Primary | ICD-10-CM

## 2022-07-22 PROCEDURE — 99213 OFFICE O/P EST LOW 20 MIN: CPT | Performed by: STUDENT IN AN ORGANIZED HEALTH CARE EDUCATION/TRAINING PROGRAM

## 2022-07-22 ASSESSMENT — MONTREAL COGNITIVE ASSESSMENT (MOCA): WHAT IS THE TOTAL SCORE (OUT OF 30): 27

## 2022-08-24 ENCOUNTER — APPOINTMENT (OUTPATIENT)
Dept: DERMATOLOGY | Age: 57
End: 2022-08-24
Attending: STUDENT IN AN ORGANIZED HEALTH CARE EDUCATION/TRAINING PROGRAM

## 2022-09-08 ENCOUNTER — APPOINTMENT (OUTPATIENT)
Dept: LAB | Age: 57
End: 2022-09-08

## 2022-10-28 ENCOUNTER — CLINICAL ABSTRACT (OUTPATIENT)
Dept: OTHER | Age: 57
End: 2022-10-28

## 2022-12-09 ENCOUNTER — OFFICE VISIT (OUTPATIENT)
Dept: FAMILY MEDICINE | Age: 57
End: 2022-12-09

## 2022-12-09 VITALS
BODY MASS INDEX: 23.99 KG/M2 | DIASTOLIC BLOOD PRESSURE: 71 MMHG | SYSTOLIC BLOOD PRESSURE: 130 MMHG | WEIGHT: 172 LBS | OXYGEN SATURATION: 99 % | HEART RATE: 84 BPM

## 2022-12-09 DIAGNOSIS — M25.562 LEFT ANTERIOR KNEE PAIN: Primary | ICD-10-CM

## 2022-12-09 PROCEDURE — 99213 OFFICE O/P EST LOW 20 MIN: CPT | Performed by: STUDENT IN AN ORGANIZED HEALTH CARE EDUCATION/TRAINING PROGRAM

## 2022-12-09 ASSESSMENT — PAIN SCALES - GENERAL: PAINLEVEL: 2

## 2023-01-30 ENCOUNTER — OFFICE VISIT (OUTPATIENT)
Dept: REHABILITATION | Age: 58
End: 2023-01-30
Attending: STUDENT IN AN ORGANIZED HEALTH CARE EDUCATION/TRAINING PROGRAM

## 2023-01-30 DIAGNOSIS — M25.662 STIFFNESS OF LEFT KNEE: Primary | ICD-10-CM

## 2023-01-30 DIAGNOSIS — G89.29 CHRONIC PAIN OF LEFT KNEE: ICD-10-CM

## 2023-01-30 DIAGNOSIS — R26.2 DIFFICULTY WALKING: ICD-10-CM

## 2023-01-30 DIAGNOSIS — M25.562 CHRONIC PAIN OF LEFT KNEE: ICD-10-CM

## 2023-01-30 PROCEDURE — 97110 THERAPEUTIC EXERCISES: CPT | Performed by: STUDENT IN AN ORGANIZED HEALTH CARE EDUCATION/TRAINING PROGRAM

## 2023-01-30 PROCEDURE — 97162 PT EVAL MOD COMPLEX 30 MIN: CPT | Performed by: STUDENT IN AN ORGANIZED HEALTH CARE EDUCATION/TRAINING PROGRAM

## 2023-01-30 ASSESSMENT — ENCOUNTER SYMPTOMS
PAIN SCALE AT HIGHEST: 0
ALLEVIATING FACTORS: REST
SUBJECTIVE PAIN PROGRESSION: IMPROVED
QUALITY: POPPING / CLICKING
PAIN LOCATION: LEFT KNEE
PAIN SCALE AT LOWEST: 0
ALLEVIATING FACTORS: SUPPORTIVE DEVICE
PAIN SEVERITY NOW: 0

## 2023-01-31 ASSESSMENT — MOVEMENT AND STRENGTH ASSESSMENTS
YOUR USUAL HOBBIES, RECREATIONAL OR SPORTING ACTIVIITIES: A LITTLE BIT OF DIFFICULTY
PUTTING ON YOUR SHOES OR SOCKS: NO DIFFICULTY
GOING UP OR DOWN 10 STAIRS (ABOUT 1 FLIGHT OF STAIRS): NO DIFFICULTY
RUNNING ON EVEN GROUND: NO DIFFICULTY
RUNNING ON UNEVEN GROUND: NO DIFFICULTY
SQUATTING: NO DIFFICULTY
ANY OF YOUR USUAL WORK, HOUSEWORK OR SCHOOL ACTIVITIES: NO DIFFICULTY
HOPPING: NO DIFFICULTY
WALKING BETWEEN ROOMS: NO DIFFICULTY
STANDING FOR 1 HOUR: NO DIFFICULTY
SITTING FOR 1 HOUR: NO DIFFICULTY
LIFTING AN OBJECT, LIKE A BAG OF GROCERIES, FROM THE FLOOR: NO DIFFICULTY
PERFORMING HEAVY ACTIVITIES AROUND YOUR HOME: A LITTLE BIT OF DIFFICULTY
GETTING INTO OR OUT OF THE BATH: NO DIFFICULTY
PERFORMING LIGHT ACTIVITES AROUND YOUR HOME: NO DIFFICULTY
WALKING A MILE: NO DIFFICULTY
ROLLING OVER IN BED: NO DIFFICULTY
TOTAL SCORE: 97.5
WALKING 2 BLOCKS: NO DIFFICULTY
GETTING INTO OR OUT OF A CAR: NO DIFFICULTY
MAKING SHARP TURNS WHILE RUNNING FAST: NO DIFFICULTY

## 2023-02-03 ENCOUNTER — OFFICE VISIT (OUTPATIENT)
Dept: REHABILITATION | Age: 58
End: 2023-02-03
Attending: STUDENT IN AN ORGANIZED HEALTH CARE EDUCATION/TRAINING PROGRAM

## 2023-02-03 DIAGNOSIS — G89.29 CHRONIC PAIN OF LEFT KNEE: ICD-10-CM

## 2023-02-03 DIAGNOSIS — M25.562 CHRONIC PAIN OF LEFT KNEE: ICD-10-CM

## 2023-02-03 DIAGNOSIS — R26.2 DIFFICULTY WALKING: ICD-10-CM

## 2023-02-03 DIAGNOSIS — M25.662 STIFFNESS OF LEFT KNEE: Primary | ICD-10-CM

## 2023-02-03 PROCEDURE — 97530 THERAPEUTIC ACTIVITIES: CPT | Performed by: STUDENT IN AN ORGANIZED HEALTH CARE EDUCATION/TRAINING PROGRAM

## 2023-02-03 PROCEDURE — 97140 MANUAL THERAPY 1/> REGIONS: CPT | Performed by: STUDENT IN AN ORGANIZED HEALTH CARE EDUCATION/TRAINING PROGRAM

## 2023-02-03 PROCEDURE — 97110 THERAPEUTIC EXERCISES: CPT | Performed by: STUDENT IN AN ORGANIZED HEALTH CARE EDUCATION/TRAINING PROGRAM

## 2023-02-06 ENCOUNTER — APPOINTMENT (OUTPATIENT)
Dept: REHABILITATION | Age: 58
End: 2023-02-06
Attending: STUDENT IN AN ORGANIZED HEALTH CARE EDUCATION/TRAINING PROGRAM

## 2023-02-10 ENCOUNTER — APPOINTMENT (OUTPATIENT)
Dept: REHABILITATION | Age: 58
End: 2023-02-10
Attending: STUDENT IN AN ORGANIZED HEALTH CARE EDUCATION/TRAINING PROGRAM

## 2023-02-13 ENCOUNTER — APPOINTMENT (OUTPATIENT)
Dept: REHABILITATION | Age: 58
End: 2023-02-13
Attending: STUDENT IN AN ORGANIZED HEALTH CARE EDUCATION/TRAINING PROGRAM

## 2023-02-17 ENCOUNTER — APPOINTMENT (OUTPATIENT)
Dept: REHABILITATION | Age: 58
End: 2023-02-17
Attending: STUDENT IN AN ORGANIZED HEALTH CARE EDUCATION/TRAINING PROGRAM

## 2023-02-20 ENCOUNTER — APPOINTMENT (OUTPATIENT)
Dept: REHABILITATION | Age: 58
End: 2023-02-20
Attending: STUDENT IN AN ORGANIZED HEALTH CARE EDUCATION/TRAINING PROGRAM

## 2023-02-24 ENCOUNTER — APPOINTMENT (OUTPATIENT)
Dept: REHABILITATION | Age: 58
End: 2023-02-24
Attending: STUDENT IN AN ORGANIZED HEALTH CARE EDUCATION/TRAINING PROGRAM

## 2023-04-03 ENCOUNTER — OFFICE VISIT (OUTPATIENT)
Dept: FAMILY MEDICINE | Age: 58
End: 2023-04-03

## 2023-04-03 ENCOUNTER — LAB SERVICES (OUTPATIENT)
Dept: LAB | Age: 58
End: 2023-04-03

## 2023-04-03 VITALS
TEMPERATURE: 96.8 F | SYSTOLIC BLOOD PRESSURE: 110 MMHG | HEART RATE: 79 BPM | OXYGEN SATURATION: 99 % | HEIGHT: 71 IN | BODY MASS INDEX: 24.35 KG/M2 | DIASTOLIC BLOOD PRESSURE: 80 MMHG | WEIGHT: 173.9 LBS

## 2023-04-03 DIAGNOSIS — Z00.00 ROUTINE ADULT HEALTH MAINTENANCE: Primary | ICD-10-CM

## 2023-04-03 DIAGNOSIS — Z12.5 PROSTATE CANCER SCREENING: ICD-10-CM

## 2023-04-03 DIAGNOSIS — Z00.00 ROUTINE ADULT HEALTH MAINTENANCE: ICD-10-CM

## 2023-04-03 DIAGNOSIS — B07.0 PLANTAR WART OF BOTH FEET: ICD-10-CM

## 2023-04-03 LAB
ALBUMIN SERPL-MCNC: 4 G/DL (ref 3.6–5.1)
ALBUMIN/GLOB SERPL: 1.3 {RATIO} (ref 1–2.4)
ALP SERPL-CCNC: 86 UNITS/L (ref 45–117)
ALT SERPL-CCNC: 23 UNITS/L
ANION GAP SERPL CALC-SCNC: 14 MMOL/L (ref 7–19)
AST SERPL-CCNC: 30 UNITS/L
BASOPHILS # BLD: 0 K/MCL (ref 0–0.3)
BASOPHILS NFR BLD: 0 %
BILIRUB SERPL-MCNC: 0.4 MG/DL (ref 0.2–1)
BUN SERPL-MCNC: 26 MG/DL (ref 6–20)
BUN/CREAT SERPL: 26 (ref 7–25)
CALCIUM SERPL-MCNC: 9.1 MG/DL (ref 8.4–10.2)
CHLORIDE SERPL-SCNC: 102 MMOL/L (ref 97–110)
CO2 SERPL-SCNC: 28 MMOL/L (ref 21–32)
CREAT SERPL-MCNC: 1 MG/DL (ref 0.67–1.17)
DEPRECATED RDW RBC: 41.1 FL (ref 39–50)
EOSINOPHIL # BLD: 0.2 K/MCL (ref 0–0.5)
EOSINOPHIL NFR BLD: 3 %
ERYTHROCYTE [DISTWIDTH] IN BLOOD: 11.7 % (ref 11–15)
FASTING DURATION TIME PATIENT: 0 HOURS (ref 0–999)
GFR SERPLBLD BASED ON 1.73 SQ M-ARVRAT: 87 ML/MIN
GLOBULIN SER-MCNC: 3.1 G/DL (ref 2–4)
GLUCOSE SERPL-MCNC: 80 MG/DL (ref 70–99)
HBA1C MFR BLD: 4.7 % (ref 4.5–5.6)
HCT VFR BLD CALC: 49.9 % (ref 39–51)
HGB BLD-MCNC: 16.8 G/DL (ref 13–17)
IMM GRANULOCYTES # BLD AUTO: 0 K/MCL (ref 0–0.2)
IMM GRANULOCYTES # BLD: 0 %
LYMPHOCYTES # BLD: 1.7 K/MCL (ref 1–4)
LYMPHOCYTES NFR BLD: 23 %
MCH RBC QN AUTO: 31.7 PG (ref 26–34)
MCHC RBC AUTO-ENTMCNC: 33.7 G/DL (ref 32–36.5)
MCV RBC AUTO: 94.2 FL (ref 78–100)
MONOCYTES # BLD: 0.6 K/MCL (ref 0.3–0.9)
MONOCYTES NFR BLD: 8 %
NEUTROPHILS # BLD: 4.8 K/MCL (ref 1.8–7.7)
NEUTROPHILS NFR BLD: 66 %
PLATELET # BLD AUTO: 218 K/MCL (ref 140–450)
POTASSIUM SERPL-SCNC: 4.5 MMOL/L (ref 3.4–5.1)
PROT SERPL-MCNC: 7.1 G/DL (ref 6.4–8.2)
RBC # BLD: 5.3 MIL/MCL (ref 4.5–5.9)
SODIUM SERPL-SCNC: 139 MMOL/L (ref 135–145)
WBC # BLD: 7.3 K/MCL (ref 4.2–11)

## 2023-04-03 PROCEDURE — 84153 ASSAY OF PSA TOTAL: CPT | Performed by: INTERNAL MEDICINE

## 2023-04-03 PROCEDURE — 17110 DESTRUCTION B9 LES UP TO 14: CPT | Performed by: STUDENT IN AN ORGANIZED HEALTH CARE EDUCATION/TRAINING PROGRAM

## 2023-04-03 PROCEDURE — 83036 HEMOGLOBIN GLYCOSYLATED A1C: CPT | Performed by: INTERNAL MEDICINE

## 2023-04-03 PROCEDURE — 36415 COLL VENOUS BLD VENIPUNCTURE: CPT | Performed by: INTERNAL MEDICINE

## 2023-04-03 PROCEDURE — 99396 PREV VISIT EST AGE 40-64: CPT | Performed by: STUDENT IN AN ORGANIZED HEALTH CARE EDUCATION/TRAINING PROGRAM

## 2023-04-03 PROCEDURE — 85025 COMPLETE CBC W/AUTO DIFF WBC: CPT | Performed by: INTERNAL MEDICINE

## 2023-04-03 PROCEDURE — 80061 LIPID PANEL: CPT | Performed by: INTERNAL MEDICINE

## 2023-04-03 PROCEDURE — 80053 COMPREHEN METABOLIC PANEL: CPT | Performed by: INTERNAL MEDICINE

## 2023-04-03 ASSESSMENT — PATIENT HEALTH QUESTIONNAIRE - PHQ9
SUM OF ALL RESPONSES TO PHQ9 QUESTIONS 1 AND 2: 0
1. LITTLE INTEREST OR PLEASURE IN DOING THINGS: NOT AT ALL
2. FEELING DOWN, DEPRESSED OR HOPELESS: NOT AT ALL
SUM OF ALL RESPONSES TO PHQ9 QUESTIONS 1 AND 2: 0
CLINICAL INTERPRETATION OF PHQ2 SCORE: NO FURTHER SCREENING NEEDED

## 2023-04-04 LAB
CHOLEST SERPL-MCNC: 241 MG/DL
CHOLEST/HDLC SERPL: 3.7 {RATIO}
FASTING DURATION TIME PATIENT: 0 HOURS (ref 0–999)
HDLC SERPL-MCNC: 65 MG/DL
LDLC SERPL CALC-MCNC: 159 MG/DL
NONHDLC SERPL-MCNC: 176 MG/DL
PSA SERPL-MCNC: 1.85 NG/ML
TRIGL SERPL-MCNC: 87 MG/DL

## 2023-04-05 ENCOUNTER — TELEPHONE (OUTPATIENT)
Dept: FAMILY MEDICINE | Age: 58
End: 2023-04-05

## 2024-04-10 ENCOUNTER — APPOINTMENT (OUTPATIENT)
Dept: FAMILY MEDICINE | Age: 59
End: 2024-04-10

## 2024-04-10 VITALS
SYSTOLIC BLOOD PRESSURE: 118 MMHG | HEIGHT: 72 IN | BODY MASS INDEX: 24.24 KG/M2 | WEIGHT: 179 LBS | OXYGEN SATURATION: 98 % | DIASTOLIC BLOOD PRESSURE: 70 MMHG | HEART RATE: 80 BPM

## 2024-04-10 DIAGNOSIS — Z12.5 PROSTATE CANCER SCREENING: ICD-10-CM

## 2024-04-10 DIAGNOSIS — Z00.00 ROUTINE ADULT HEALTH MAINTENANCE: Primary | ICD-10-CM

## 2024-04-10 ASSESSMENT — PATIENT HEALTH QUESTIONNAIRE - PHQ9
2. FEELING DOWN, DEPRESSED OR HOPELESS: NOT AT ALL
1. LITTLE INTEREST OR PLEASURE IN DOING THINGS: NOT AT ALL
SUM OF ALL RESPONSES TO PHQ9 QUESTIONS 1 AND 2: 0
CLINICAL INTERPRETATION OF PHQ2 SCORE: NO FURTHER SCREENING NEEDED
SUM OF ALL RESPONSES TO PHQ9 QUESTIONS 1 AND 2: 0

## 2024-04-11 ENCOUNTER — LAB SERVICES (OUTPATIENT)
Dept: LAB | Age: 59
End: 2024-04-11

## 2024-04-11 DIAGNOSIS — Z00.00 ROUTINE ADULT HEALTH MAINTENANCE: ICD-10-CM

## 2024-04-11 DIAGNOSIS — Z12.5 PROSTATE CANCER SCREENING: ICD-10-CM

## 2024-04-11 LAB
ALBUMIN SERPL-MCNC: 3.8 G/DL (ref 3.6–5.1)
ALBUMIN/GLOB SERPL: 1.1 {RATIO} (ref 1–2.4)
ALP SERPL-CCNC: 77 UNITS/L (ref 45–117)
ALT SERPL-CCNC: 22 UNITS/L
ANION GAP SERPL CALC-SCNC: 6 MMOL/L (ref 7–19)
AST SERPL-CCNC: 29 UNITS/L
BASOPHILS # BLD: 0.1 K/MCL (ref 0–0.3)
BASOPHILS NFR BLD: 1 %
BILIRUB SERPL-MCNC: 1 MG/DL (ref 0.2–1)
BUN SERPL-MCNC: 18 MG/DL (ref 6–20)
BUN/CREAT SERPL: 19 (ref 7–25)
CALCIUM SERPL-MCNC: 9.2 MG/DL (ref 8.4–10.2)
CHLORIDE SERPL-SCNC: 108 MMOL/L (ref 97–110)
CHOLEST SERPL-MCNC: 241 MG/DL
CHOLEST/HDLC SERPL: 4.1 {RATIO}
CO2 SERPL-SCNC: 30 MMOL/L (ref 21–32)
CREAT SERPL-MCNC: 0.96 MG/DL (ref 0.67–1.17)
DEPRECATED RDW RBC: 43.1 FL (ref 39–50)
EGFRCR SERPLBLD CKD-EPI 2021: 90 ML/MIN/{1.73_M2}
EOSINOPHIL # BLD: 0.2 K/MCL (ref 0–0.5)
EOSINOPHIL NFR BLD: 2 %
ERYTHROCYTE [DISTWIDTH] IN BLOOD: 12.4 % (ref 11–15)
FASTING DURATION TIME PATIENT: 12 HOURS (ref 0–999)
GLOBULIN SER-MCNC: 3.6 G/DL (ref 2–4)
GLUCOSE SERPL-MCNC: 88 MG/DL (ref 70–99)
HBA1C MFR BLD: 4.9 % (ref 4.5–5.6)
HCT VFR BLD CALC: 52 % (ref 39–51)
HDLC SERPL-MCNC: 59 MG/DL
HGB BLD-MCNC: 17.3 G/DL (ref 13–17)
IMM GRANULOCYTES # BLD AUTO: 0.1 K/MCL (ref 0–0.2)
IMM GRANULOCYTES # BLD: 1 %
LDLC SERPL CALC-MCNC: 164 MG/DL
LYMPHOCYTES # BLD: 1.3 K/MCL (ref 1–4)
LYMPHOCYTES NFR BLD: 17 %
MCH RBC QN AUTO: 31.6 PG (ref 26–34)
MCHC RBC AUTO-ENTMCNC: 33.3 G/DL (ref 32–36.5)
MCV RBC AUTO: 95.1 FL (ref 78–100)
MONOCYTES # BLD: 0.7 K/MCL (ref 0.3–0.9)
MONOCYTES NFR BLD: 9 %
NEUTROPHILS # BLD: 5.3 K/MCL (ref 1.8–7.7)
NEUTROPHILS NFR BLD: 70 %
NONHDLC SERPL-MCNC: 182 MG/DL
NRBC BLD MANUAL-RTO: 0 /100 WBC
PLATELET # BLD AUTO: 225 K/MCL (ref 140–450)
POTASSIUM SERPL-SCNC: 4.8 MMOL/L (ref 3.4–5.1)
PROT SERPL-MCNC: 7.4 G/DL (ref 6.4–8.2)
PSA SERPL-MCNC: 4.68 NG/ML
RBC # BLD: 5.47 MIL/MCL (ref 4.5–5.9)
SODIUM SERPL-SCNC: 139 MMOL/L (ref 135–145)
TRIGL SERPL-MCNC: 92 MG/DL
WBC # BLD: 7.6 K/MCL (ref 4.2–11)

## 2024-04-11 PROCEDURE — 84153 ASSAY OF PSA TOTAL: CPT | Performed by: CLINICAL MEDICAL LABORATORY

## 2024-04-11 PROCEDURE — 36415 COLL VENOUS BLD VENIPUNCTURE: CPT | Performed by: INTERNAL MEDICINE

## 2024-04-11 PROCEDURE — 83036 HEMOGLOBIN GLYCOSYLATED A1C: CPT | Performed by: CLINICAL MEDICAL LABORATORY

## 2024-04-11 PROCEDURE — 80061 LIPID PANEL: CPT | Performed by: CLINICAL MEDICAL LABORATORY

## 2024-04-11 PROCEDURE — 85025 COMPLETE CBC W/AUTO DIFF WBC: CPT | Performed by: CLINICAL MEDICAL LABORATORY

## 2024-04-11 PROCEDURE — 80053 COMPREHEN METABOLIC PANEL: CPT | Performed by: CLINICAL MEDICAL LABORATORY

## 2024-04-17 ENCOUNTER — TELEPHONE (OUTPATIENT)
Dept: FAMILY MEDICINE | Age: 59
End: 2024-04-17

## 2024-04-17 DIAGNOSIS — R97.20 ELEVATED PSA: Primary | ICD-10-CM

## 2024-05-20 ENCOUNTER — APPOINTMENT (OUTPATIENT)
Dept: LAB | Age: 59
End: 2024-05-20

## 2024-05-20 DIAGNOSIS — R97.20 ELEVATED PSA: ICD-10-CM

## 2024-05-20 PROCEDURE — 36415 COLL VENOUS BLD VENIPUNCTURE: CPT | Performed by: INTERNAL MEDICINE

## 2024-05-20 PROCEDURE — 84153 ASSAY OF PSA TOTAL: CPT | Performed by: CLINICAL MEDICAL LABORATORY

## 2024-05-21 ENCOUNTER — TELEPHONE (OUTPATIENT)
Dept: FAMILY MEDICINE | Age: 59
End: 2024-05-21

## 2024-05-21 DIAGNOSIS — R97.20 ELEVATED PSA: Primary | ICD-10-CM

## 2024-05-21 DIAGNOSIS — Z00.00 ROUTINE ADULT HEALTH MAINTENANCE: ICD-10-CM

## 2024-05-21 DIAGNOSIS — Z12.5 PROSTATE CANCER SCREENING: ICD-10-CM

## 2024-05-21 LAB — PSA SERPL-MCNC: 1.74 NG/ML

## 2025-04-07 SDOH — ECONOMIC STABILITY: FOOD INSECURITY: WITHIN THE PAST 12 MONTHS, THE FOOD YOU BOUGHT JUST DIDN'T LAST AND YOU DIDN'T HAVE MONEY TO GET MORE.: NEVER TRUE

## 2025-04-07 SDOH — ECONOMIC STABILITY: TRANSPORTATION INSECURITY
IN THE PAST 12 MONTHS, HAS LACK OF RELIABLE TRANSPORTATION KEPT YOU FROM MEDICAL APPOINTMENTS, MEETINGS, WORK OR FROM GETTING THINGS NEEDED FOR DAILY LIVING?: NO

## 2025-04-07 SDOH — ECONOMIC STABILITY: HOUSING INSECURITY: WHAT IS YOUR LIVING SITUATION TODAY?: I HAVE A STEADY PLACE TO LIVE

## 2025-04-07 SDOH — ECONOMIC STABILITY: HOUSING INSECURITY: DO YOU HAVE PROBLEMS WITH ANY OF THE FOLLOWING?: NONE OF THE ABOVE

## 2025-04-07 ASSESSMENT — SOCIAL DETERMINANTS OF HEALTH (SDOH): IN THE PAST 12 MONTHS, HAS THE ELECTRIC, GAS, OIL, OR WATER COMPANY THREATENED TO SHUT OFF SERVICE IN YOUR HOME?: NO

## 2025-04-14 ENCOUNTER — APPOINTMENT (OUTPATIENT)
Dept: LAB | Age: 60
End: 2025-04-14

## 2025-04-14 ENCOUNTER — APPOINTMENT (OUTPATIENT)
Dept: FAMILY MEDICINE | Age: 60
End: 2025-04-14

## 2025-04-14 VITALS
WEIGHT: 173 LBS | BODY MASS INDEX: 23.46 KG/M2 | SYSTOLIC BLOOD PRESSURE: 102 MMHG | HEART RATE: 68 BPM | OXYGEN SATURATION: 100 % | DIASTOLIC BLOOD PRESSURE: 70 MMHG

## 2025-04-14 DIAGNOSIS — R97.20 ELEVATED PSA: ICD-10-CM

## 2025-04-14 DIAGNOSIS — Z00.00 ROUTINE ADULT HEALTH MAINTENANCE: ICD-10-CM

## 2025-04-14 DIAGNOSIS — Z00.00 ROUTINE ADULT HEALTH MAINTENANCE: Primary | ICD-10-CM

## 2025-04-14 LAB
ALBUMIN SERPL-MCNC: 3.9 G/DL (ref 3.4–5)
ALBUMIN/GLOB SERPL: 1.3 {RATIO} (ref 1–2.4)
ALP SERPL-CCNC: 74 UNITS/L (ref 45–117)
ALT SERPL-CCNC: 26 UNITS/L
ANION GAP SERPL CALC-SCNC: 10 MMOL/L (ref 7–19)
AST SERPL-CCNC: 38 UNITS/L
BASOPHILS # BLD: 0 K/MCL (ref 0–0.3)
BASOPHILS NFR BLD: 1 %
BILIRUB SERPL-MCNC: 0.8 MG/DL (ref 0.2–1)
BUN SERPL-MCNC: 21 MG/DL (ref 6–20)
BUN/CREAT SERPL: 22 (ref 7–25)
CALCIUM SERPL-MCNC: 9.2 MG/DL (ref 8.4–10.2)
CHLORIDE SERPL-SCNC: 106 MMOL/L (ref 97–110)
CHOLEST SERPL-MCNC: 239 MG/DL
CHOLEST/HDLC SERPL: 3.7 {RATIO}
CO2 SERPL-SCNC: 28 MMOL/L (ref 21–32)
CREAT SERPL-MCNC: 0.94 MG/DL (ref 0.67–1.17)
DEPRECATED RDW RBC: 42.5 FL (ref 39–50)
EGFRCR SERPLBLD CKD-EPI 2021: >90 ML/MIN/{1.73_M2}
EOSINOPHIL # BLD: 0.2 K/MCL (ref 0–0.5)
EOSINOPHIL NFR BLD: 4 %
ERYTHROCYTE [DISTWIDTH] IN BLOOD: 12.3 % (ref 11–15)
FASTING DURATION TIME PATIENT: 24 HOURS (ref 0–999)
GLOBULIN SER-MCNC: 3.1 G/DL (ref 2–4)
GLUCOSE SERPL-MCNC: 86 MG/DL (ref 70–99)
HBA1C MFR BLD: 4.9 % (ref 4.5–5.6)
HCT VFR BLD CALC: 51.2 % (ref 39–51)
HDLC SERPL-MCNC: 65 MG/DL
HGB BLD-MCNC: 16.7 G/DL (ref 13–17)
IMM GRANULOCYTES # BLD AUTO: 0 K/MCL (ref 0–0.2)
IMM GRANULOCYTES # BLD: 0 %
LDLC SERPL CALC-MCNC: 160 MG/DL
LYMPHOCYTES # BLD: 1.3 K/MCL (ref 1–4)
LYMPHOCYTES NFR BLD: 26 %
MCH RBC QN AUTO: 31 PG (ref 26–34)
MCHC RBC AUTO-ENTMCNC: 32.6 G/DL (ref 32–36.5)
MCV RBC AUTO: 95 FL (ref 78–100)
MONOCYTES # BLD: 0.6 K/MCL (ref 0.3–0.9)
MONOCYTES NFR BLD: 11 %
NEUTROPHILS # BLD: 3 K/MCL (ref 1.8–7.7)
NEUTROPHILS NFR BLD: 58 %
NONHDLC SERPL-MCNC: 174 MG/DL
NRBC BLD MANUAL-RTO: 0 /100 WBC
PLATELET # BLD AUTO: 224 K/MCL (ref 140–450)
POTASSIUM SERPL-SCNC: 4.5 MMOL/L (ref 3.4–5.1)
PROT SERPL-MCNC: 7 G/DL (ref 6.4–8.2)
PSA SERPL-MCNC: 2.51 NG/ML
RBC # BLD: 5.39 MIL/MCL (ref 4.5–5.9)
SODIUM SERPL-SCNC: 139 MMOL/L (ref 135–145)
TRIGL SERPL-MCNC: 71 MG/DL
WBC # BLD: 5.1 K/MCL (ref 4.2–11)

## 2025-04-14 PROCEDURE — 80053 COMPREHEN METABOLIC PANEL: CPT | Performed by: CLINICAL MEDICAL LABORATORY

## 2025-04-14 PROCEDURE — 80061 LIPID PANEL: CPT | Performed by: CLINICAL MEDICAL LABORATORY

## 2025-04-14 PROCEDURE — 36415 COLL VENOUS BLD VENIPUNCTURE: CPT | Performed by: INTERNAL MEDICINE

## 2025-04-14 PROCEDURE — 99396 PREV VISIT EST AGE 40-64: CPT | Performed by: STUDENT IN AN ORGANIZED HEALTH CARE EDUCATION/TRAINING PROGRAM

## 2025-04-14 PROCEDURE — 85025 COMPLETE CBC W/AUTO DIFF WBC: CPT | Performed by: CLINICAL MEDICAL LABORATORY

## 2025-04-14 PROCEDURE — 84153 ASSAY OF PSA TOTAL: CPT | Performed by: CLINICAL MEDICAL LABORATORY

## 2025-04-14 PROCEDURE — 83036 HEMOGLOBIN GLYCOSYLATED A1C: CPT | Performed by: CLINICAL MEDICAL LABORATORY

## 2025-04-14 ASSESSMENT — PATIENT HEALTH QUESTIONNAIRE - PHQ9
SUM OF ALL RESPONSES TO PHQ9 QUESTIONS 1 AND 2: 0
CLINICAL INTERPRETATION OF PHQ2 SCORE: NO FURTHER SCREENING NEEDED
1. LITTLE INTEREST OR PLEASURE IN DOING THINGS: NOT AT ALL
2. FEELING DOWN, DEPRESSED OR HOPELESS: NOT AT ALL
SUM OF ALL RESPONSES TO PHQ9 QUESTIONS 1 AND 2: 0

## 2025-04-15 ENCOUNTER — TELEPHONE (OUTPATIENT)
Dept: FAMILY MEDICINE | Age: 60
End: 2025-04-15

## 2025-04-15 DIAGNOSIS — Z13.228 SCREENING FOR ENDOCRINE, NUTRITIONAL, METABOLIC AND IMMUNITY DISORDER: ICD-10-CM

## 2025-04-15 DIAGNOSIS — Z13.29 SCREENING FOR ENDOCRINE, NUTRITIONAL, METABOLIC AND IMMUNITY DISORDER: ICD-10-CM

## 2025-04-15 DIAGNOSIS — Z13.21 SCREENING FOR ENDOCRINE, NUTRITIONAL, METABOLIC AND IMMUNITY DISORDER: ICD-10-CM

## 2025-04-15 DIAGNOSIS — Z13.0 SCREENING FOR ENDOCRINE, NUTRITIONAL, METABOLIC AND IMMUNITY DISORDER: ICD-10-CM

## 2025-04-15 DIAGNOSIS — Z13.220 ENCOUNTER FOR LIPID SCREENING FOR CARDIOVASCULAR DISEASE: ICD-10-CM

## 2025-04-15 DIAGNOSIS — Z13.6 ENCOUNTER FOR LIPID SCREENING FOR CARDIOVASCULAR DISEASE: ICD-10-CM

## 2025-04-15 DIAGNOSIS — Z12.5 PROSTATE CANCER SCREENING: Primary | ICD-10-CM

## 2025-04-15 DIAGNOSIS — Z13.1 SCREENING FOR DIABETES MELLITUS (DM): ICD-10-CM

## 2026-04-13 ENCOUNTER — APPOINTMENT (OUTPATIENT)
Dept: LAB | Age: 61
End: 2026-04-13

## 2026-04-20 ENCOUNTER — APPOINTMENT (OUTPATIENT)
Dept: FAMILY MEDICINE | Age: 61
End: 2026-04-20